# Patient Record
Sex: MALE | Race: WHITE | NOT HISPANIC OR LATINO | Employment: UNEMPLOYED | URBAN - METROPOLITAN AREA
[De-identification: names, ages, dates, MRNs, and addresses within clinical notes are randomized per-mention and may not be internally consistent; named-entity substitution may affect disease eponyms.]

---

## 2024-05-25 ENCOUNTER — HOSPITAL ENCOUNTER (INPATIENT)
Facility: HOSPITAL | Age: 49
LOS: 1 days | Discharge: LEFT BEFORE DISCHARGE | DRG: 918 | End: 2024-05-26
Attending: STUDENT IN AN ORGANIZED HEALTH CARE EDUCATION/TRAINING PROGRAM | Admitting: INTERNAL MEDICINE

## 2024-05-25 DIAGNOSIS — R79.89 ELEVATED TROPONIN I LEVEL: ICD-10-CM

## 2024-05-25 DIAGNOSIS — T50.901A OVERDOSE: ICD-10-CM

## 2024-05-25 DIAGNOSIS — M62.82 NON-TRAUMATIC RHABDOMYOLYSIS: ICD-10-CM

## 2024-05-25 DIAGNOSIS — R07.9 CHEST PAIN: ICD-10-CM

## 2024-05-25 DIAGNOSIS — E87.20 LACTIC ACIDOSIS: ICD-10-CM

## 2024-05-25 DIAGNOSIS — R79.89 ELEVATED TROPONIN: Primary | ICD-10-CM

## 2024-05-25 PROCEDURE — 99285 EMERGENCY DEPT VISIT HI MDM: CPT

## 2024-05-25 NOTE — Clinical Note
Diagnosis: Overdose [202577]   Future Attending Provider: HAMLET GUADARRAMA [[id number]]   Reason for IP Medical Treatment  (Clinical interventions that can only be accomplished in the IP setting? ) :: elevated troponin   I certify that Inpatient services for greater than or equal to 2 midnights are medically necessary:: Yes   Plans for Post-Acute care--if anticipated (pick the single best option):: A. No post acute care anticipated at this time

## 2024-05-26 VITALS
BODY MASS INDEX: 29.97 KG/M2 | SYSTOLIC BLOOD PRESSURE: 131 MMHG | HEART RATE: 77 BPM | WEIGHT: 214.06 LBS | OXYGEN SATURATION: 94 % | HEIGHT: 71 IN | DIASTOLIC BLOOD PRESSURE: 70 MMHG | RESPIRATION RATE: 20 BRPM | TEMPERATURE: 98 F

## 2024-05-26 PROBLEM — F10.10 ALCOHOL ABUSE: Status: ACTIVE | Noted: 2024-05-26

## 2024-05-26 PROBLEM — F19.10 POLYSUBSTANCE ABUSE: Status: ACTIVE | Noted: 2024-05-26

## 2024-05-26 PROBLEM — F41.8 DEPRESSION WITH ANXIETY: Status: ACTIVE | Noted: 2024-05-26

## 2024-05-26 PROBLEM — R11.2 NAUSEA AND VOMITING: Status: ACTIVE | Noted: 2024-05-26

## 2024-05-26 PROBLEM — T50.901A DRUG OVERDOSE, ACCIDENTAL OR UNINTENTIONAL, INITIAL ENCOUNTER: Status: ACTIVE | Noted: 2024-05-26

## 2024-05-26 PROBLEM — R21 RASH: Status: ACTIVE | Noted: 2024-05-26

## 2024-05-26 PROBLEM — R03.0 ELEVATED BLOOD PRESSURE READING WITHOUT DIAGNOSIS OF HYPERTENSION: Status: ACTIVE | Noted: 2024-05-26

## 2024-05-26 PROBLEM — R79.89 ELEVATED TROPONIN: Status: ACTIVE | Noted: 2024-05-26

## 2024-05-26 PROBLEM — R09.02 HYPOXIA: Status: ACTIVE | Noted: 2024-05-26

## 2024-05-26 PROBLEM — E87.20 LACTIC ACIDOSIS: Status: ACTIVE | Noted: 2024-05-26

## 2024-05-26 PROBLEM — R74.8 ELEVATED CK: Status: ACTIVE | Noted: 2024-05-26

## 2024-05-26 LAB
ALBUMIN SERPL BCP-MCNC: 4.4 G/DL (ref 3.5–5.2)
ALP SERPL-CCNC: 42 U/L (ref 55–135)
ALT SERPL W/O P-5'-P-CCNC: 30 U/L (ref 10–44)
AMPHET+METHAMPHET UR QL: NEGATIVE
ANION GAP SERPL CALC-SCNC: 17 MMOL/L (ref 8–16)
AORTIC ROOT ANNULUS: 3.72 CM
AST SERPL-CCNC: 41 U/L (ref 10–40)
AV INDEX (PROSTH): 0.76
AV MEAN GRADIENT: 5 MMHG
AV PEAK GRADIENT: 10 MMHG
AV VALVE AREA BY VELOCITY RATIO: 3.46 CM²
AV VALVE AREA: 3.21 CM²
AV VELOCITY RATIO: 0.82
BACTERIA #/AREA URNS HPF: ABNORMAL /HPF
BARBITURATES UR QL SCN>200 NG/ML: NEGATIVE
BASOPHILS # BLD AUTO: 0.02 K/UL (ref 0–0.2)
BASOPHILS NFR BLD: 0.2 % (ref 0–1.9)
BENZODIAZ UR QL SCN>200 NG/ML: ABNORMAL
BILIRUB SERPL-MCNC: 0.4 MG/DL (ref 0.1–1)
BILIRUB UR QL STRIP: NEGATIVE
BNP SERPL-MCNC: 47 PG/ML (ref 0–99)
BSA FOR ECHO PROCEDURE: 2.21 M2
BUN SERPL-MCNC: 6 MG/DL (ref 6–20)
BZE UR QL SCN: NEGATIVE
CALCIUM SERPL-MCNC: 8.9 MG/DL (ref 8.7–10.5)
CANNABINOIDS UR QL SCN: ABNORMAL
CHLORIDE SERPL-SCNC: 102 MMOL/L (ref 95–110)
CHOLEST SERPL-MCNC: 183 MG/DL (ref 120–199)
CHOLEST/HDLC SERPL: 3.5 {RATIO} (ref 2–5)
CK SERPL-CCNC: 866 U/L (ref 20–200)
CLARITY UR: CLEAR
CO2 SERPL-SCNC: 17 MMOL/L (ref 23–29)
COLOR UR: YELLOW
CREAT SERPL-MCNC: 1.2 MG/DL (ref 0.5–1.4)
CREAT UR-MCNC: 117.7 MG/DL (ref 23–375)
CV ECHO LV RWT: 0.37 CM
DIFFERENTIAL METHOD BLD: ABNORMAL
DOP CALC AO PEAK VEL: 1.6 M/S
DOP CALC AO VTI: 34.6 CM
DOP CALC LVOT AREA: 4.2 CM2
DOP CALC LVOT DIAMETER: 2.32 CM
DOP CALC LVOT PEAK VEL: 1.31 M/S
DOP CALC LVOT STROKE VOLUME: 111.12 CM3
DOP CALC RVOT PEAK VEL: 0.59 M/S
DOP CALC RVOT VTI: 14 CM
DOP CALCLVOT PEAK VEL VTI: 26.3 CM
E WAVE DECELERATION TIME: 317.11 MSEC
E/A RATIO: 1.42
E/E' RATIO: 5.87 M/S
ECHO LV POSTERIOR WALL: 0.96 CM (ref 0.6–1.1)
EJECTION FRACTION: 60 %
EOSINOPHIL # BLD AUTO: 0 K/UL (ref 0–0.5)
EOSINOPHIL NFR BLD: 0 % (ref 0–8)
ERYTHROCYTE [DISTWIDTH] IN BLOOD BY AUTOMATED COUNT: 11.9 % (ref 11.5–14.5)
EST. GFR  (NO RACE VARIABLE): >60 ML/MIN/1.73 M^2
ESTIMATED AVG GLUCOSE: 97 MG/DL (ref 68–131)
ETHANOL SERPL-MCNC: 21 MG/DL
FRACTIONAL SHORTENING: 43 % (ref 28–44)
GLUCOSE SERPL-MCNC: 105 MG/DL (ref 70–110)
GLUCOSE UR QL STRIP: NEGATIVE
GRAN CASTS #/AREA URNS LPF: 39 /LPF
HBA1C MFR BLD: 5 % (ref 4–5.6)
HCT VFR BLD AUTO: 40.7 % (ref 40–54)
HCT VFR BLD AUTO: 42.3 % (ref 40–54)
HDLC SERPL-MCNC: 53 MG/DL (ref 40–75)
HDLC SERPL: 29 % (ref 20–50)
HGB BLD-MCNC: 14.6 G/DL (ref 14–18)
HGB BLD-MCNC: 15 G/DL (ref 14–18)
HGB UR QL STRIP: NEGATIVE
HYALINE CASTS #/AREA URNS LPF: 15 /LPF
IMM GRANULOCYTES # BLD AUTO: 0.05 K/UL (ref 0–0.04)
IMM GRANULOCYTES NFR BLD AUTO: 0.5 % (ref 0–0.5)
INR PPP: 0.9 (ref 0.8–1.2)
INTERVENTRICULAR SEPTUM: 1.01 CM (ref 0.6–1.1)
IVRT: 82.78 MSEC
KETONES UR QL STRIP: ABNORMAL
LA MAJOR: 4.6 CM
LA MINOR: 5.62 CM
LA WIDTH: 4 CM
LACTATE SERPL-SCNC: 1 MMOL/L (ref 0.5–2.2)
LACTATE SERPL-SCNC: 2.3 MMOL/L (ref 0.5–2.2)
LACTATE SERPL-SCNC: 4.2 MMOL/L (ref 0.5–2.2)
LDLC SERPL CALC-MCNC: 111.8 MG/DL (ref 63–159)
LEFT ATRIUM SIZE: 3.94 CM
LEFT ATRIUM VOLUME INDEX MOD: 34.6 ML/M2
LEFT ATRIUM VOLUME INDEX: 31.2 ML/M2
LEFT ATRIUM VOLUME MOD: 75.13 CM3
LEFT ATRIUM VOLUME: 67.77 CM3
LEFT INTERNAL DIMENSION IN SYSTOLE: 2.93 CM (ref 2.1–4)
LEFT VENTRICLE DIASTOLIC VOLUME INDEX: 58.77 ML/M2
LEFT VENTRICLE DIASTOLIC VOLUME: 127.54 ML
LEFT VENTRICLE MASS INDEX: 87 G/M2
LEFT VENTRICLE SYSTOLIC VOLUME INDEX: 15.2 ML/M2
LEFT VENTRICLE SYSTOLIC VOLUME: 33.07 ML
LEFT VENTRICULAR INTERNAL DIMENSION IN DIASTOLE: 5.17 CM (ref 3.5–6)
LEFT VENTRICULAR MASS: 188.47 G
LEUKOCYTE ESTERASE UR QL STRIP: NEGATIVE
LV LATERAL E/E' RATIO: 5.87 M/S
LV SEPTAL E/E' RATIO: 5.87 M/S
LVOT MG: 3.41 MMHG
LVOT MV: 0.85 CM/S
LYMPHOCYTES # BLD AUTO: 0.7 K/UL (ref 1–4.8)
LYMPHOCYTES NFR BLD: 7 % (ref 18–48)
MCH RBC QN AUTO: 33.4 PG (ref 27–31)
MCHC RBC AUTO-ENTMCNC: 35.5 G/DL (ref 32–36)
MCV RBC AUTO: 94 FL (ref 82–98)
METHADONE UR QL SCN>300 NG/ML: NEGATIVE
MICROSCOPIC COMMENT: ABNORMAL
MONOCYTES # BLD AUTO: 0.5 K/UL (ref 0.3–1)
MONOCYTES NFR BLD: 5.1 % (ref 4–15)
MV PEAK A VEL: 0.62 M/S
MV PEAK E VEL: 0.88 M/S
NEUTROPHILS # BLD AUTO: 8.8 K/UL (ref 1.8–7.7)
NEUTROPHILS NFR BLD: 87.2 % (ref 38–73)
NITRITE UR QL STRIP: NEGATIVE
NONHDLC SERPL-MCNC: 130 MG/DL
NRBC BLD-RTO: 0 /100 WBC
OHS CV RV/LV RATIO: 0.51 CM
OPIATES UR QL SCN: NEGATIVE
PCP UR QL SCN>25 NG/ML: NEGATIVE
PH UR STRIP: 5 [PH] (ref 5–8)
PISA TR MAX VEL: 2.32 M/S
PLATELET # BLD AUTO: 261 K/UL (ref 150–450)
PMV BLD AUTO: 8.8 FL (ref 9.2–12.9)
POTASSIUM SERPL-SCNC: 4.2 MMOL/L (ref 3.5–5.1)
PROT SERPL-MCNC: 7.4 G/DL (ref 6–8.4)
PROT UR QL STRIP: ABNORMAL
PROTHROMBIN TIME: 11 SEC (ref 9–12.5)
PV MEAN GRADIENT: 1 MMHG
PV PEAK GRADIENT: 3 MMHG
PV PEAK VELOCITY: 0.86 M/S
RA MAJOR: 5.1 CM
RA PRESSURE ESTIMATED: 3 MMHG
RA WIDTH: 3.8 CM
RBC # BLD AUTO: 4.49 M/UL (ref 4.6–6.2)
RBC #/AREA URNS HPF: 0 /HPF (ref 0–4)
RIGHT VENTRICULAR END-DIASTOLIC DIMENSION: 2.63 CM
RV TB RVSP: 5 MMHG
SODIUM SERPL-SCNC: 136 MMOL/L (ref 136–145)
SP GR UR STRIP: 1.01 (ref 1–1.03)
STJ: 3.06 CM
TDI LATERAL: 0.15 M/S
TDI SEPTAL: 0.15 M/S
TDI: 0.15 M/S
TOXICOLOGY INFORMATION: ABNORMAL
TR MAX PG: 22 MMHG
TRICUSPID ANNULAR PLANE SYSTOLIC EXCURSION: 2.27 CM
TRIGL SERPL-MCNC: 91 MG/DL (ref 30–150)
TROPONIN I SERPL DL<=0.01 NG/ML-MCNC: 0.04 NG/ML (ref 0–0.03)
TROPONIN I SERPL DL<=0.01 NG/ML-MCNC: 0.07 NG/ML (ref 0–0.03)
TROPONIN I SERPL DL<=0.01 NG/ML-MCNC: 0.08 NG/ML (ref 0–0.03)
TSH SERPL DL<=0.005 MIU/L-ACNC: 1.38 UIU/ML (ref 0.4–4)
TV REST PULMONARY ARTERY PRESSURE: 25 MMHG
URN SPEC COLLECT METH UR: ABNORMAL
UROBILINOGEN UR STRIP-ACNC: NEGATIVE EU/DL
WBC # BLD AUTO: 10.1 K/UL (ref 3.9–12.7)
WBC #/AREA URNS HPF: 3 /HPF (ref 0–5)
Z-SCORE OF LEFT VENTRICULAR DIMENSION IN END DIASTOLE: -3.29
Z-SCORE OF LEFT VENTRICULAR DIMENSION IN END SYSTOLE: -3.16

## 2024-05-26 PROCEDURE — 83605 ASSAY OF LACTIC ACID: CPT | Mod: 91 | Performed by: INTERNAL MEDICINE

## 2024-05-26 PROCEDURE — 93010 ELECTROCARDIOGRAM REPORT: CPT | Mod: ,,, | Performed by: INTERNAL MEDICINE

## 2024-05-26 PROCEDURE — 85610 PROTHROMBIN TIME: CPT | Performed by: INTERNAL MEDICINE

## 2024-05-26 PROCEDURE — 83605 ASSAY OF LACTIC ACID: CPT | Mod: 91 | Performed by: STUDENT IN AN ORGANIZED HEALTH CARE EDUCATION/TRAINING PROGRAM

## 2024-05-26 PROCEDURE — 83036 HEMOGLOBIN GLYCOSYLATED A1C: CPT | Performed by: INTERNAL MEDICINE

## 2024-05-26 PROCEDURE — 82550 ASSAY OF CK (CPK): CPT | Performed by: STUDENT IN AN ORGANIZED HEALTH CARE EDUCATION/TRAINING PROGRAM

## 2024-05-26 PROCEDURE — 63600175 PHARM REV CODE 636 W HCPCS: Performed by: INTERNAL MEDICINE

## 2024-05-26 PROCEDURE — 25000003 PHARM REV CODE 250: Performed by: INTERNAL MEDICINE

## 2024-05-26 PROCEDURE — G0425 INPT/ED TELECONSULT30: HCPCS | Mod: 95,,, | Performed by: PSYCHIATRY & NEUROLOGY

## 2024-05-26 PROCEDURE — 93005 ELECTROCARDIOGRAM TRACING: CPT

## 2024-05-26 PROCEDURE — 80061 LIPID PANEL: CPT | Performed by: INTERNAL MEDICINE

## 2024-05-26 PROCEDURE — 63600175 PHARM REV CODE 636 W HCPCS: Performed by: STUDENT IN AN ORGANIZED HEALTH CARE EDUCATION/TRAINING PROGRAM

## 2024-05-26 PROCEDURE — 94761 N-INVAS EAR/PLS OXIMETRY MLT: CPT

## 2024-05-26 PROCEDURE — 80307 DRUG TEST PRSMV CHEM ANLYZR: CPT | Performed by: STUDENT IN AN ORGANIZED HEALTH CARE EDUCATION/TRAINING PROGRAM

## 2024-05-26 PROCEDURE — 96375 TX/PRO/DX INJ NEW DRUG ADDON: CPT

## 2024-05-26 PROCEDURE — 85014 HEMATOCRIT: CPT | Performed by: INTERNAL MEDICINE

## 2024-05-26 PROCEDURE — 84484 ASSAY OF TROPONIN QUANT: CPT | Mod: 91 | Performed by: STUDENT IN AN ORGANIZED HEALTH CARE EDUCATION/TRAINING PROGRAM

## 2024-05-26 PROCEDURE — 36415 COLL VENOUS BLD VENIPUNCTURE: CPT | Performed by: INTERNAL MEDICINE

## 2024-05-26 PROCEDURE — 81000 URINALYSIS NONAUTO W/SCOPE: CPT | Mod: 59 | Performed by: STUDENT IN AN ORGANIZED HEALTH CARE EDUCATION/TRAINING PROGRAM

## 2024-05-26 PROCEDURE — 21400001 HC TELEMETRY ROOM

## 2024-05-26 PROCEDURE — 84443 ASSAY THYROID STIM HORMONE: CPT | Performed by: INTERNAL MEDICINE

## 2024-05-26 PROCEDURE — 85018 HEMOGLOBIN: CPT | Performed by: INTERNAL MEDICINE

## 2024-05-26 PROCEDURE — 84484 ASSAY OF TROPONIN QUANT: CPT | Mod: 91 | Performed by: INTERNAL MEDICINE

## 2024-05-26 PROCEDURE — 25000003 PHARM REV CODE 250: Performed by: STUDENT IN AN ORGANIZED HEALTH CARE EDUCATION/TRAINING PROGRAM

## 2024-05-26 PROCEDURE — 82077 ASSAY SPEC XCP UR&BREATH IA: CPT | Performed by: INTERNAL MEDICINE

## 2024-05-26 PROCEDURE — C9113 INJ PANTOPRAZOLE SODIUM, VIA: HCPCS | Performed by: INTERNAL MEDICINE

## 2024-05-26 PROCEDURE — 83880 ASSAY OF NATRIURETIC PEPTIDE: CPT | Performed by: INTERNAL MEDICINE

## 2024-05-26 PROCEDURE — 94799 UNLISTED PULMONARY SVC/PX: CPT

## 2024-05-26 PROCEDURE — 63600175 PHARM REV CODE 636 W HCPCS: Performed by: EMERGENCY MEDICINE

## 2024-05-26 PROCEDURE — 99222 1ST HOSP IP/OBS MODERATE 55: CPT | Mod: ,,, | Performed by: INTERNAL MEDICINE

## 2024-05-26 PROCEDURE — 85025 COMPLETE CBC W/AUTO DIFF WBC: CPT | Performed by: STUDENT IN AN ORGANIZED HEALTH CARE EDUCATION/TRAINING PROGRAM

## 2024-05-26 PROCEDURE — 96361 HYDRATE IV INFUSION ADD-ON: CPT

## 2024-05-26 PROCEDURE — 96374 THER/PROPH/DIAG INJ IV PUSH: CPT

## 2024-05-26 PROCEDURE — 80053 COMPREHEN METABOLIC PANEL: CPT | Performed by: STUDENT IN AN ORGANIZED HEALTH CARE EDUCATION/TRAINING PROGRAM

## 2024-05-26 PROCEDURE — C9113 INJ PANTOPRAZOLE SODIUM, VIA: HCPCS | Performed by: STUDENT IN AN ORGANIZED HEALTH CARE EDUCATION/TRAINING PROGRAM

## 2024-05-26 RX ORDER — NALOXONE HCL 0.4 MG/ML
0.02 VIAL (ML) INJECTION
Status: DISCONTINUED | OUTPATIENT
Start: 2024-05-26 | End: 2024-05-26 | Stop reason: HOSPADM

## 2024-05-26 RX ORDER — THIAMINE HCL 100 MG
100 TABLET ORAL DAILY
Status: DISCONTINUED | OUTPATIENT
Start: 2024-05-26 | End: 2024-05-26 | Stop reason: HOSPADM

## 2024-05-26 RX ORDER — ACETAMINOPHEN 325 MG/1
650 TABLET ORAL EVERY 6 HOURS PRN
Status: DISCONTINUED | OUTPATIENT
Start: 2024-05-26 | End: 2024-05-26 | Stop reason: HOSPADM

## 2024-05-26 RX ORDER — AMLODIPINE BESYLATE 5 MG/1
5 TABLET ORAL DAILY
Status: DISCONTINUED | OUTPATIENT
Start: 2024-05-26 | End: 2024-05-26 | Stop reason: HOSPADM

## 2024-05-26 RX ORDER — SODIUM CHLORIDE 9 MG/ML
INJECTION, SOLUTION INTRAVENOUS CONTINUOUS
Status: DISCONTINUED | OUTPATIENT
Start: 2024-05-26 | End: 2024-05-26 | Stop reason: HOSPADM

## 2024-05-26 RX ORDER — SODIUM CHLORIDE 9 MG/ML
1000 INJECTION, SOLUTION INTRAVENOUS
Status: COMPLETED | OUTPATIENT
Start: 2024-05-26 | End: 2024-05-26

## 2024-05-26 RX ORDER — PANTOPRAZOLE SODIUM 40 MG/10ML
40 INJECTION, POWDER, LYOPHILIZED, FOR SOLUTION INTRAVENOUS 2 TIMES DAILY
Status: DISCONTINUED | OUTPATIENT
Start: 2024-05-26 | End: 2024-05-26 | Stop reason: HOSPADM

## 2024-05-26 RX ORDER — FOLIC ACID 1 MG/1
1 TABLET ORAL DAILY
Status: DISCONTINUED | OUTPATIENT
Start: 2024-05-26 | End: 2024-05-26 | Stop reason: HOSPADM

## 2024-05-26 RX ORDER — IPRATROPIUM BROMIDE AND ALBUTEROL SULFATE 2.5; .5 MG/3ML; MG/3ML
3 SOLUTION RESPIRATORY (INHALATION) EVERY 6 HOURS PRN
Status: DISCONTINUED | OUTPATIENT
Start: 2024-05-26 | End: 2024-05-26 | Stop reason: HOSPADM

## 2024-05-26 RX ORDER — IBUPROFEN 200 MG
24 TABLET ORAL
Status: DISCONTINUED | OUTPATIENT
Start: 2024-05-26 | End: 2024-05-26 | Stop reason: HOSPADM

## 2024-05-26 RX ORDER — IBUPROFEN 200 MG
16 TABLET ORAL
Status: DISCONTINUED | OUTPATIENT
Start: 2024-05-26 | End: 2024-05-26 | Stop reason: HOSPADM

## 2024-05-26 RX ORDER — ONDANSETRON HYDROCHLORIDE 2 MG/ML
4 INJECTION, SOLUTION INTRAVENOUS EVERY 6 HOURS PRN
Status: DISCONTINUED | OUTPATIENT
Start: 2024-05-26 | End: 2024-05-26 | Stop reason: HOSPADM

## 2024-05-26 RX ORDER — CHLORDIAZEPOXIDE HYDROCHLORIDE 25 MG/1
25 CAPSULE, GELATIN COATED ORAL 4 TIMES DAILY PRN
Status: DISCONTINUED | OUTPATIENT
Start: 2024-05-26 | End: 2024-05-26 | Stop reason: HOSPADM

## 2024-05-26 RX ORDER — PANTOPRAZOLE SODIUM 40 MG/10ML
80 INJECTION, POWDER, LYOPHILIZED, FOR SOLUTION INTRAVENOUS
Status: COMPLETED | OUTPATIENT
Start: 2024-05-26 | End: 2024-05-26

## 2024-05-26 RX ORDER — HYDRALAZINE HYDROCHLORIDE 20 MG/ML
10 INJECTION INTRAMUSCULAR; INTRAVENOUS EVERY 6 HOURS PRN
Status: DISCONTINUED | OUTPATIENT
Start: 2024-05-26 | End: 2024-05-26 | Stop reason: HOSPADM

## 2024-05-26 RX ORDER — ONDANSETRON HYDROCHLORIDE 2 MG/ML
4 INJECTION, SOLUTION INTRAVENOUS
Status: COMPLETED | OUTPATIENT
Start: 2024-05-26 | End: 2024-05-26

## 2024-05-26 RX ORDER — ESCITALOPRAM OXALATE 10 MG/1
20 TABLET ORAL DAILY
Status: DISCONTINUED | OUTPATIENT
Start: 2024-05-26 | End: 2024-05-26 | Stop reason: HOSPADM

## 2024-05-26 RX ORDER — SODIUM CHLORIDE 0.9 % (FLUSH) 0.9 %
10 SYRINGE (ML) INJECTION EVERY 12 HOURS PRN
Status: DISCONTINUED | OUTPATIENT
Start: 2024-05-26 | End: 2024-05-26 | Stop reason: HOSPADM

## 2024-05-26 RX ORDER — GLUCAGON 1 MG
1 KIT INJECTION
Status: DISCONTINUED | OUTPATIENT
Start: 2024-05-26 | End: 2024-05-26 | Stop reason: HOSPADM

## 2024-05-26 RX ADMIN — SODIUM CHLORIDE 1000 ML: 9 INJECTION, SOLUTION INTRAVENOUS at 12:05

## 2024-05-26 RX ADMIN — Medication 100 MG: at 09:05

## 2024-05-26 RX ADMIN — FOLIC ACID 1 MG: 1 TABLET ORAL at 09:05

## 2024-05-26 RX ADMIN — PANTOPRAZOLE SODIUM 80 MG: 40 INJECTION, POWDER, LYOPHILIZED, FOR SOLUTION INTRAVENOUS at 01:05

## 2024-05-26 RX ADMIN — AMLODIPINE BESYLATE 5 MG: 5 TABLET ORAL at 09:05

## 2024-05-26 RX ADMIN — SODIUM CHLORIDE: 9 INJECTION, SOLUTION INTRAVENOUS at 06:05

## 2024-05-26 RX ADMIN — SODIUM CHLORIDE 1000 ML: 9 INJECTION, SOLUTION INTRAVENOUS at 01:05

## 2024-05-26 RX ADMIN — ONDANSETRON 4 MG: 2 INJECTION INTRAMUSCULAR; INTRAVENOUS at 04:05

## 2024-05-26 RX ADMIN — ESCITALOPRAM OXALATE 20 MG: 10 TABLET ORAL at 09:05

## 2024-05-26 RX ADMIN — ONDANSETRON 4 MG: 2 INJECTION INTRAMUSCULAR; INTRAVENOUS at 01:05

## 2024-05-26 RX ADMIN — ACETAMINOPHEN 650 MG: 325 TABLET ORAL at 06:05

## 2024-05-26 RX ADMIN — PANTOPRAZOLE SODIUM 40 MG: 40 INJECTION, POWDER, LYOPHILIZED, FOR SOLUTION INTRAVENOUS at 09:05

## 2024-05-26 NOTE — H&P
[facility] - Emergency Dept.  [facility] Medicine  History & Physical    Patient Name: Timo Love  MRN: [id number]  Patient Class: IP- Inpatient  Admission Date: 5/25/2024  Attending Physician:  Janet Mohr MD  Primary Care Provider: No primary care provider on file.         Patient information was obtained from patient, ER records, and ER physician .     Subjective:     Principal Problem:Drug overdose, accidental or unintentional, initial encounter    Chief Complaint:   Chief Complaint   Patient presents with    Drug Overdose     Cyanotic and apneic on EMS arrival with SpO2 at 60%. + response to 1mg narcan IM.        HPI:   49-year-old white man with history of polysubstance abuse, alcohol abuse, depression with anxiety, and previous alcohol withdrawal seizures who presented to the emergency department via EMS after being found cyanotic, apneic, and with O2 saturation in the 60s.  Patient was administered 1 mg of Narcan IM with good response.  Patient reports that he had been clean and sober from heroin for over a year and had been clean and sober from alcohol for 42 days.  Patient relapsed last night on both alcohol and heroin.  Patient had been in his usual state of health prior to presentation.  He does report that he noticed that his blood pressure has been running higher than usual since he has not been drinking alcohol.  He currently denies any chest pain, cough, fevers, chills.  He does have some mild shortness of breath, constant, no aggravating or alleviating factors, no associated wheezing. He did have several episodes of nausea with vomiting in the emergency department with his 1st episode appearing like coffee-ground emesis.  ER physician did give Protonix 80 mg IV x1 dose.  Patient denies recent bleeding from any source.  He denies hemoptysis, hematemesis, bright red blood per rectum, melena, or hematuria.  He denies any associated abdominal pain.  Patient does have a rash (related to chiggers) to  his bilateral lower extremities which he reports was from hiking last week, areas are no longer pruritic and are healing per patient.    No past medical history on file.    No past surgical history on file.    Review of patient's allergies indicates:  No Known Allergies    No current facility-administered medications on file prior to encounter.     No current outpatient medications on file prior to encounter.     Family History    None       Tobacco Use    Smoking status: Not on file    Smokeless tobacco: Not on file   Substance and Sexual Activity    Alcohol use: Not on file    Drug use: Not on file    Sexual activity: Not on file     Review of Systems   Constitutional:  Negative for chills and fever.   Respiratory:  Positive for shortness of breath. Negative for cough.    Cardiovascular:  Negative for chest pain.   Gastrointestinal:  Positive for nausea and vomiting. Negative for abdominal pain, blood in stool and diarrhea.   Skin:  Positive for rash.   All other systems reviewed and are negative.    Objective:     Vital Signs (Most Recent):  Temp: 99.1 °F (37.3 °C) (05/25/24 2322)  Pulse: 99 (05/26/24 0402)  Resp: 15 (05/26/24 0200)  BP: (!) 148/91 (05/26/24 0402)  SpO2: (!) 94 % (05/26/24 0402) Vital Signs (24h Range):  Temp:  [99.1 °F (37.3 °C)] 99.1 °F (37.3 °C)  Pulse:  [] 99  Resp:  [15-26] 15  SpO2:  [92 %-96 %] 94 %  BP: (127-160)/() 148/91        There is no height or weight on file to calculate BMI.     Physical Exam  Vitals reviewed.   Constitutional:       General: He is not in acute distress.     Appearance: He is not ill-appearing or diaphoretic.   HENT:      Nose: Nose normal.   Eyes:      Conjunctiva/sclera: Conjunctivae normal.   Cardiovascular:      Rate and Rhythm: Normal rate.   Pulmonary:      Effort: Pulmonary effort is normal. No respiratory distress.      Breath sounds: No wheezing.   Abdominal:      General: There is no distension.      Tenderness: There is no abdominal  tenderness.   Musculoskeletal:         General: No swelling.   Skin:     General: Skin is warm and dry.      Findings: Rash (to BLE related to recent chiggers per patient) present.   Neurological:      Mental Status: He is alert and oriented to person, place, and time.   Psychiatric:         Mood and Affect: Mood normal.         Behavior: Behavior normal.                Significant Labs: All pertinent labs within the past 24 hours have been reviewed.  Recent Lab Results  (Last 5 results in the past 24 hours)        05/26/24  0425   05/26/24  0313   05/26/24  0040   05/26/24  0029   05/26/24  0013        Benzodiazepines       Presumptive Positive         Methadone metabolites       Negative         Phencyclidine       Negative         Albumin         4.4       Alcohol, Serum 21               ALP         42       ALT         30       Amphetamines, Urine       Negative         Anion Gap         17       Appearance, UA     Clear           AST         41       Bacteria, UA     None           Barbituates, Urine       Negative         Baso #         0.02       Basophil %         0.2       Bilirubin (UA)     Negative           BILIRUBIN TOTAL         0.4  Comment: For infants and newborns, interpretation of results should be based  on gestational age, weight and in agreement with clinical  observations.    Premature Infant recommended reference ranges:  Up to 24 hours.............<8.0 mg/dL  Up to 48 hours............<12.0 mg/dL  3-5 days..................<15.0 mg/dL  6-29 days.................<15.0 mg/dL         BUN         6       Calcium         8.9       Chloride         102       CO2         17       Cocaine, Urine       Negative         Color, UA     Yellow           CPK   866             Creatinine         1.2       Urine Creatinine       117.7         Differential Method         Automated       eGFR         >60       Eos #         0.0       Eos %         0.0       Glucose         105       Glucose, UA     Negative            Gran # (ANC)         8.8       Gran %         87.2       Granular Casts, UA     39           Hematocrit         42.3       Hemoglobin         15.0       Hyaline Casts, UA     15           Immature Grans (Abs)         0.05  Comment: Mild elevation in immature granulocytes is non specific and   can be seen in a variety of conditions including stress response,   acute inflammation, trauma and pregnancy. Correlation with other   laboratory and clinical findings is essential.         Immature Granulocytes         0.5       Ketones, UA     Trace           Lactic Acid Level   2.3  Comment: Falsely low lactic acid results can be found in samples   containing >=13.0 mg/dL total bilirubin and/or >=3.5 mg/dL   direct bilirubin.         4.2  Comment: Falsely low lactic acid results can be found in samples   containing >=13.0 mg/dL total bilirubin and/or >=3.5 mg/dL   direct bilirubin.  LA  critical result(s) called and verbal readback obtained from JUAN M KOLB RN ED by [facility] 05/26/2024 00:42         Leukocyte Esterase, UA     Negative           Lymph #         0.7       Lymph %         7.0       MCH         33.4       MCHC         35.5       MCV         94       Microscopic Comment     SEE COMMENT  Comment: Other formed elements not mentioned in the report are not   present in the microscopic examination.              Mono #         0.5       Mono %         5.1       MPV         8.8       NITRITE UA     Negative           nRBC         0       Blood, UA     Negative           Opiates, Urine       Negative         pH, UA     5.0           Platelet Count         261       Potassium         4.2       PROTEIN TOTAL         7.4       Protein, UA     1+  Comment: Recommend a 24 hour urine protein or a urine   protein/creatinine ratio if globulin induced proteinuria is  clinically suspected.             RBC         4.49       RBC, UA     0           RDW         11.9       Sodium         136       Specific [place], UA     1.015            Specimen UA     Urine, Clean Catch           Marijuana (THC) Metabolite       Presumptive Positive         Toxicology Information       SEE COMMENT  Comment: This screen includes the following classes of drugs at the listed   cut-off:    Benzodiazepines 200 ng/ml  Methadone 300 ng/ml  Cocaine metabolite 300 ng/ml  Opiates 300 ng/ml  Barbiturates 200 ng/ml  Amphetamines 1000 ng/ml  Marijuana metabs (THC) 50 ng/ml  Phencyclidine (PCP) 25 ng/ml    This is a screening test. If results do not correlate with clinical   presentation, then a confirmatory send out test is advised.     This report is intended for use in clinical monitoring and management   of   patients. It is not intended for use in employment related drug   testing.           Troponin I   0.080  Comment: The reference interval for Troponin I represents the 99th percentile   cutoff   for our facility and is consistent with 3rd generation assay   performance.         0.037  Comment: The reference interval for Troponin I represents the 99th percentile   cutoff   for our facility and is consistent with 3rd generation assay   performance.         UROBILINOGEN UA     Negative           WBC, UA     3           WBC         10.10                              Significant Imaging: I have reviewed all pertinent imaging results/findings within the past 24 hours.  X-Ray Chest AP Portable   Final Result      No acute findings         Electronically signed by: Remy Yun   Date:    05/26/2024   Time:    01:37         Assessment/Plan:     * Drug overdose, accidental or unintentional, initial encounter   Patient reports that he relapsed last night on both alcohol and heroin  - urine drug screen positive for benzodiazepines and THC  - patient had favorable response after IM Narcan administration  - counseled on cessation of alcohol and illicit drugs  - consider tele psych consult      Hypoxia   Hypoxia on EMS arrival, resolved  - related to drug overdose and  may be further complicated by nausea with vomiting and possible aspiration component  - chest x-ray negative  - continuous pulse oximetry monitoring, O2 supplementation if needed, incentive spirometry, p.r.n. DuoNebs, aspiration precautions  - antibiotics not indicated at this time      Lactic acidosis   Lactic acidosis improved with IV fluid resuscitation given in the emergency department  - lactic acid level on arrival 4.2 and repeat 2.3, white blood cell count 10.10, urinalysis negative for infection, chest x-ray negative, T-max 99.1°  - patient received 2 L normal saline IV fluid bolus in the emergency department  - continue IV fluids with normal saline at 100 mL/hr  - check serial lactic acid level  - antibiotics not indicated at this time      Elevated troponin   Elevated troponin most likely related to drug overdose and hypoxia with ventricular strain, abnormal EKG  - troponin 0.037 and repeat 0.080  - EKG #1 with accelerated junctional rhythm and nonspecific ST segment changes with some elevation  - EKG #2 with normal sinus rhythm at 81 with nonspecific ST segment changes  - chest x-ray negative  - check serial cardiac enzymes, BNP, TSH, fasting lipid panel, and A1c  - check echocardiogram  - NPO except for ice chips and with IV fluids  - telemetry monitoring  - cardiology consult      Elevated blood pressure reading without diagnosis of hypertension  - p.r.n. IV hydralazine with parameters      Polysubstance abuse   Patient admits to heroin use  - urine drug screen was only positive for benzodiazepines and THC  - counseled on illicit drug cessation      Alcohol abuse  - counseled alcohol cessation  - [facility] protocol  - thiamine and folic acid supplementation      Nausea and vomiting   Several episodes of nausea and vomiting while in the emergency department.  Report of possible coffee-ground emesis with initial episode.  -  Hemoglobin 15  - status post Protonix 80 mg IV x1 dose in the emergency department  -  continue Protonix 40 mg IV b.I.d. dosing  - NPO except for ice chips, IV fluids, and p.r.n. antiemetics  - check H&H q.6 hours x2, INR, and occult blood  - check CBC in a.m.  - consider GI consult      Elevated CK    Elevated CK level with early rhabdomyolysis secondary to drug overdose  - CK level 866, creatinine 1.2, LFTs overall unremarkable  - patient received 2 L normal saline IV fluid bolus in the emergency department  - continue IV fluids with normal saline at 100 mL/hr  - check a.m. labs to include repeat CK level      Rash    Rash to bilateral lower extremities which patient attributes to hiking in the woods last week with chigger infestation.  Areas are now nonpruritic and healing per patient.      Depression with anxiety   Patient is currently on no home medications for this.  Consider tele psych consult.      VTE Risk Mitigation (From admission, onward)           Ordered     IP VTE LOW RISK PATIENT  Once         05/26/24 0453     Place sequential compression device  Until discontinued         05/26/24 0453                                    Janet Mohr MD  Department of Hospital Medicine  [facility] - Emergency Dept.

## 2024-05-26 NOTE — PLAN OF CARE
POC reviewed with pt. Pt verbalizes understanding of POC. No questions at this time.  AAOx3. NADN.  NSR on cardiac monitor.  Pt. Anxious and agitated about getting to his personal belongings things left at hotel he was staying at. Pt educated and agrees to stay for now.    Pt remains free of falls.  Safety measures in place. Will continue to monitor.  Informed pt to call for assistance before getting up. Pt verbalizes understanding.  Hourly rounding and chart check complete.

## 2024-05-26 NOTE — ASSESSMENT & PLAN NOTE
Lactic acidosis improved with IV fluid resuscitation given in the emergency department  - lactic acid level on arrival 4.2 and repeat 2.3, white blood cell count 10.10, urinalysis negative for infection, chest x-ray negative, T-max 99.1°  - patient received 2 L normal saline IV fluid bolus in the emergency department  - continue IV fluids with normal saline at 100 mL/hr  - check serial lactic acid level  - antibiotics not indicated at this time

## 2024-05-26 NOTE — ASSESSMENT & PLAN NOTE
Patient admits to heroin use  - urine drug screen was only positive for benzodiazepines and THC  - counseled on illicit drug cessation

## 2024-05-26 NOTE — HOSPITAL COURSE
Cardiology and tele psychiatry consulted. Pt continued on IVF given mildly elevated CPK.     Notified by RN approx 6pm that patient had gathered his belongings, taken out his IV and wanted to leave hospital. Advised patient that he would benefit from remaining in hospital for continued fluids, monitoring of labs and risks of leaving include worsening illness and death. He proceeded to walk out of the room as I was speaking with him. AMA paperwork signed at nurses station with multiple witnesses. Pt left hospital AGAINST MEDICAL ADVICE.

## 2024-05-26 NOTE — CONSULTS
[facility] - Telemetry ([facility])  Cardiology  Consult Note    Patient Name: Timo Love  MRN: [id number]  Admission Date: 5/25/2024  Hospital Length of Stay: 0 days  Code Status: Full Code   Attending Provider: Amanda Lipscomb MD   Consulting Provider: Kamar Baumann MD  Primary Care Physician: No primary care provider on file.  Principal Problem:Drug overdose, accidental or unintentional, initial encounter    Patient information was obtained from patient and ER records.     Inpatient consult to Cardiology  Consult performed by: Doron Baumann MD  Consult ordered by: Janet Mohr MD        Subjective:         HPI:   Mr. Love is a 49 year old male with a past medical history of polysubstance abuse, and alcohol abuse. He was admitted for accelerated HTN, blood pressure 160/100. Patient notes that in the past 1-2 week(s) sober he noticed his diastolic was in the 100's or triple digits. Patient with SOB and headaches. Patient did have nausea and vomiting. A mild increase in troponin with a peak at 0.08 and trending down.   Recommend ECHO and blood pressure control and will start Norvasc. Troponin is from demand ischemia. If ECHO is normal patient can go home with blood pressure control medications.       Family History    None       Tobacco Use    Smoking status: Not on file    Smokeless tobacco: Not on file   Substance and Sexual Activity    Alcohol use: Not on file    Drug use: Not on file    Sexual activity: Not on file       Review of Systems   Constitutional:  Negative for chills and fever.   Respiratory:  Positive for shortness of breath. Negative for cough.    Cardiovascular:  Negative for chest pain.   Gastrointestinal:  Positive for nausea and vomiting. Negative for abdominal pain, blood in stool and diarrhea.   Skin:  Positive for rash.   All other systems reviewed and are negative.     Objective:      Vital Signs (Most Recent):  Temp: 99.1 °F (37.3 °C) (05/25/24 2322)  Pulse: 99 (05/26/24  0402)  Resp: 15 (05/26/24 0200)  BP: (!) 148/91 (05/26/24 0402)  SpO2: (!) 94 % (05/26/24 0402) Vital Signs (24h Range):  Temp:  [99.1 °F (37.3 °C)] 99.1 °F (37.3 °C)  Pulse:  [] 99  Resp:  [15-26] 15  SpO2:  [92 %-96 %] 94 %  BP: (127-160)/() 148/91         There is no height or weight on file to calculate BMI.     Physical Exam  Vitals reviewed.   Constitutional:       General: He is not in acute distress.     Appearance: He is not ill-appearing or diaphoretic.   HENT:      Nose: Nose normal.   Eyes:      Conjunctiva/sclera: Conjunctivae normal.   Cardiovascular:      Rate and Rhythm: Normal rate.   Pulmonary:      Effort: Pulmonary effort is normal. No respiratory distress.      Breath sounds: No wheezing.   Abdominal:      General: There is no distension.      Tenderness: There is no abdominal tenderness.   Musculoskeletal:         General: No swelling.   Skin:     General: Skin is warm and dry.      Findings: Rash present.   Neurological:      Mental Status: He is alert and oriented to person, place, and time.   Psychiatric:         Mood and Affect: Mood normal.         Behavior: Behavior normal.      Significant Labs: BMP:   Recent Labs   Lab 05/26/24  0013         K 4.2      CO2 17*   BUN 6   CREATININE 1.2   CALCIUM 8.9   , CMP   Recent Labs   Lab 05/26/24  0013      K 4.2      CO2 17*      BUN 6   CREATININE 1.2   CALCIUM 8.9   PROT 7.4   ALBUMIN 4.4   BILITOT 0.4   ALKPHOS 42*   AST 41*   ALT 30   ANIONGAP 17*   , CBC   Recent Labs   Lab 05/26/24  0013 05/26/24  0509   WBC 10.10  --    HGB 15.0 14.6   HCT 42.3 40.7     --    , and Troponin   Recent Labs   Lab 05/26/24  0013 05/26/24  0313 05/26/24  0509   TROPONINI 0.037* 0.080* 0.071*       Significant Imaging: Echocardiogram: 2D echo with color flow doppler: No results found for this or any previous visit. and Transthoracic echo (TTE) complete (Cupid Only):   Results for orders placed or performed  during the hospital encounter of 05/25/24   Echo   Result Value Ref Range    BSA 2.21 m2    LVOT stroke volume 111.12 cm3    LVIDd 5.17 3.5 - 6.0 cm    LV Systolic Volume 33.07 mL    LV Systolic Volume Index 15.2 mL/m2    LVIDs 2.93 2.1 - 4.0 cm    LV Diastolic Volume 127.54 mL    LV Diastolic Volume Index 58.77 mL/m2    IVS 1.01 0.6 - 1.1 cm    LVOT diameter 2.32 cm    LVOT area 4.2 cm2    FS 43 28 - 44 %    Left Ventricle Relative Wall Thickness 0.37 cm    Posterior Wall 0.96 0.6 - 1.1 cm    LV mass 188.47 g    LV Mass Index 87 g/m2    MV Peak E Martin 0.88 m/s    TDI LATERAL 0.15 m/s    TDI SEPTAL 0.15 m/s    E/E' ratio 5.87 m/s    MV Peak A Martin 0.62 m/s    TR Max Martin 2.32 m/s    E/A ratio 1.42     IVRT 82.78 msec    E wave deceleration time 317.11 msec    LV SEPTAL E/E' RATIO 5.87 m/s    LV LATERAL E/E' RATIO 5.87 m/s    LVOT peak martin 1.31 m/s    Left Ventricular Outflow Tract Mean Velocity 0.85 cm/s    Left Ventricular Outflow Tract Mean Gradient 3.41 mmHg    RVDD 2.63 cm    RVOT peak VTI 14.0 cm    TAPSE 2.27 cm    RV/LV Ratio 0.51 cm    LA size 3.94 cm    Left Atrium Minor Axis 5.62 cm    Left Atrium Major Axis 4.60 cm    LA volume (mod) 75.13 cm3    LA Volume Index (Mod) 34.6 mL/m2    RA Major Axis 5.10 cm    AV mean gradient 5 mmHg    AV peak gradient 10 mmHg    Ao peak martin 1.60 m/s    Ao VTI 34.60 cm    LVOT peak VTI 26.30 cm    AV valve area 3.21 cm²    AV Velocity Ratio 0.82     AV index (prosthetic) 0.76     VIDHI by Velocity Ratio 3.46 cm²    Triscuspid Valve Regurgitation Peak Gradient 22 mmHg    PV mean gradient 1 mmHg    PV PEAK VELOCITY 0.86 m/s    PV peak gradient 1 mmHg    RVOT peak martin 0.59 m/s    Ao root annulus 3.72 cm    STJ 3.06 cm    Mean e' 0.15 m/s    ZLVIDS -3.16     ZLVIDD -3.29     LA Volume Index 31.2 mL/m2    LA volume 67.77 cm3    LA WIDTH 4.0 cm    RA Width 3.8 cm    and EKG:   Assessment and Plan:     Mr. Love is a 49 year old male with a past medical history of polysubstance abuse,  and alcohol abuse. He was admitted for accelerated HTN, blood pressure 160/100. Patient notes that in the past 1-2 week(s) sober he noticed his diastolic was in the 100's or triple digits. Patient with SOB and headaches. Patient did have nausea and vomiting. A mild increase in troponin with a peak at 0.08 and trending down.   Recommend ECHO and blood pressure control and will start Norvasc. Troponin is from demand ischemia. If ECHO is normal patient can go home with blood pressure control medications.      Drug overdose, accidental or unintentional, initial encounter   Patient reports that he relapsed last night on both alcohol and heroin  - urine drug screen positive for benzodiazepines and THC  - patient had favorable response after IM Narcan administration  - counseled on cessation of alcohol and illicit drugs  - consider tele psych consult        Hypoxia   Hypoxia on EMS arrival, resolved  - related to drug overdose and may be further complicated by nausea with vomiting and possible aspiration component  - chest x-ray negative  - continuous pulse oximetry monitoring, O2 supplementation if needed, incentive spirometry, p.rcedric Lux, aspiration precautions  - antibiotics not indicated at this time        Lactic acidosis   Lactic acidosis improved with IV fluid resuscitation given in the emergency department  - lactic acid level on arrival 4.2 and repeat 2.3, white blood cell count 10.10, urinalysis negative for infection, chest x-ray negative, T-max 99.1°  - patient received 2 L normal saline IV fluid bolus in the emergency department  - continue IV fluids with normal saline at 100 mL/hr  - check serial lactic acid level  - antibiotics not indicated at this time        Elevated troponin   Elevated troponin most likely related to drug overdose and hypoxia with ventricular strain, abnormal EKG  - troponin 0.037 and repeat 0.080  - EKG #1 with accelerated junctional rhythm and nonspecific ST segment changes with  some elevation  - EKG #2 with normal sinus rhythm at 81 with nonspecific ST segment changes  - chest x-ray negative  - check serial cardiac enzymes, BNP, TSH, fasting lipid panel, and A1c  - check echocardiogram  - NPO except for ice chips and with IV fluids  - telemetry monitoring  - cardiology consult        Elevated blood pressure reading without diagnosis of hypertension  - p.r.n. IV hydralazine with parameters        Polysubstance abuse   Patient admits to heroin use  - urine drug screen was only positive for benzodiazepines and THC  - counseled on illicit drug cessation        Alcohol abuse  - counseled alcohol cessation  - [facility] protocol  - thiamine and folic acid supplementation        Nausea and vomiting   Several episodes of nausea and vomiting while in the emergency department.  Report of possible coffee-ground emesis with initial episode.  -  Hemoglobin 15  - status post Protonix 80 mg IV x1 dose in the emergency department  - continue Protonix 40 mg IV b.I.d. dosing  - NPO except for ice chips, IV fluids, and p.r.n. antiemetics  - check H&H q.6 hours x2, INR, and occult blood  - check CBC in a.m.  - consider GI consult        Elevated CK    Elevated CK level with early rhabdomyolysis secondary to drug overdose  - CK level 866, creatinine 1.2, LFTs overall unremarkable  - patient received 2 L normal saline IV fluid bolus in the emergency department  - continue IV fluids with normal saline at 100 mL/hr  - check a.m. labs to include repeat CK level        Rash    Rash to bilateral lower extremities which patient attributes to hiking in the woods last week with chigger infestation.  Areas are now nonpruritic and healing per patient.        Depression with anxiety   Patient is currently on no home medications for this.  Consider tele psych consult.       VTE Risk Mitigation (From admission, onward)           Ordered     IP VTE LOW RISK PATIENT  Once         05/26/24 0453     Place sequential compression  device  Until discontinued         05/26/24 0453                    Cardiology   [facility] - Telemetry ([facility])

## 2024-05-26 NOTE — ED PROVIDER NOTES
SCRIBE #1 NOTE: IAlcon am scribing for, and in the presence of, Adrien Nj MD. I have scribed the HPI, ROS, and PEx.     SCRIBE #2 NOTE: ISanjuana am scribing for, and in the presence of,  Papo Mccord MD. I have scribed the remaining portions of the note not scribed by Scribe #1.      History     Chief Complaint   Patient presents with    Drug Overdose     Cyanotic and apneic on EMS arrival with SpO2 at 60%. + response to 1mg narcan IM.     Review of patient's allergies indicates:  No Known Allergies      History of Present Illness     HPI    5/25/2024, 11:55 PM  History obtained from the paramedics  HPI and ROS limited due to AMS      History of Present Illness: Timo Love is a 49 y.o. male patient who presents to the Emergency Department for evaluation after a drug overdose that happened PTA. Paramedics report that pt was cyanotic and apneic upon their arrival, with SpO2 at 60%. They administered 1 mg of Narcan IM and had a positive response. No further complaints or concerns at this time.       Arrival mode: EMS    PCP: No primary care provider on file.        Past Medical History:  No past medical history on file.    Past Surgical History:  No past surgical history on file.      Family History:  No family history on file.    Social History:  Social History     Tobacco Use    Smoking status: Not on file    Smokeless tobacco: Not on file   Substance and Sexual Activity    Alcohol use: Not on file    Drug use: Not on file    Sexual activity: Not on file        Review of Systems     Review of Systems   Unable to perform ROS: Mental status change        Physical Exam     Initial Vitals [05/25/24 2322]   BP Pulse Resp Temp SpO2   (!) 160/100 100 16 99.1 °F (37.3 °C) 96 %      MAP       --          Physical Exam  Physical exam limited due to patient's condition.  Nursing Notes and Vital Signs Reviewed.  Constitutional: Patient is in mild distress. Well-developed and well-nourished.  Diaphoretic  Head: Atraumatic. Normocephalic.  Eyes: Pinpoint pupils.   ENT: Mucous membranes are moist.   Neck: Supple. Full ROM.   Cardiovascular: Tachycardic. Regular rhythm. No murmurs, rubs, or gallops. Distal pulses are 2+ and symmetric.  Pulmonary/Chest: No respiratory distress. Clear to auscultation bilaterally. No wheezing or rales.  Abdominal: Soft and non-distended.  There is no tenderness.  No rebound, guarding, or rigidity.  Genitourinary: No CVA tenderness  Musculoskeletal: Moves all extremities. No obvious deformities. No edema. No calf tenderness.  Skin: Warm and dry.  Neurological: Pt is awake but speaking in phrases that do not make sense. Pt does not participate in neurologic exam but moves all extremities spontaneously with good strength. No acute focal neurological deficits are appreciated.     ED Course   Critical Care    Date/Time: 5/26/2024 4:13 AM    Performed by: Papo Mccord MD  Authorized by: Papo Mccord MD  Direct patient critical care time: 20 minutes  Additional history critical care time: 5 minutes  Ordering / reviewing critical care time: 10 minutes  Documentation critical care time: 5 minutes  Consulting other physicians critical care time: 5 minutes  Total critical care time (exclusive of procedural time) : 45 minutes  Critical care time was exclusive of separately billable procedures and treating other patients and teaching time.  Critical care was necessary to treat or prevent imminent or life-threatening deterioration of the following conditions: Overdose, elevated Troponin.  Critical care was time spent personally by me on the following activities: blood draw for specimens, development of treatment plan with patient or surrogate, discussions with consultants, interpretation of cardiac output measurements, evaluation of patient's response to treatment, examination of patient, obtaining history from patient or surrogate, ordering and review of radiographic studies,  ordering and performing treatments and interventions, ordering and review of laboratory studies, pulse oximetry, re-evaluation of patient's condition and review of old charts.        ED Vital Signs:  Vitals:    05/25/24 2322 05/26/24 0030 05/26/24 0038 05/26/24 0100   BP: (!) 160/100 127/82  129/85   Pulse: 100 84 83 79   Resp: 16 (!) 26  19   Temp: 99.1 °F (37.3 °C)      TempSrc: Oral      SpO2: 96% (!) 92%  (!) 93%    05/26/24 0130 05/26/24 0200 05/26/24 0402   BP: (!) 149/92 (!) 140/88 (!) 148/91   Pulse: 82 80 99   Resp: (!) 21 15    Temp:      TempSrc:      SpO2: 95% 95% (!) 94%       Abnormal Lab Results:  Labs Reviewed   CBC W/ AUTO DIFFERENTIAL - Abnormal; Notable for the following components:       Result Value    RBC 4.49 (*)     MCH 33.4 (*)     MPV 8.8 (*)     Gran # (ANC) 8.8 (*)     Immature Grans (Abs) 0.05 (*)     Lymph # 0.7 (*)     Gran % 87.2 (*)     Lymph % 7.0 (*)     All other components within normal limits   COMPREHENSIVE METABOLIC PANEL - Abnormal; Notable for the following components:    CO2 17 (*)     Alkaline Phosphatase 42 (*)     AST 41 (*)     Anion Gap 17 (*)     All other components within normal limits   LACTIC ACID, PLASMA - Abnormal; Notable for the following components:    Lactate (Lactic Acid) 4.2 (*)     All other components within normal limits    Narrative:      LA  critical result(s) called and verbal readback obtained from JUAN M KOLB RN ED by [facility] 05/26/2024 00:42   TROPONIN I - Abnormal; Notable for the following components:    Troponin I 0.037 (*)     All other components within normal limits   DRUG SCREEN PANEL, URINE EMERGENCY - Abnormal; Notable for the following components:    Benzodiazepines Presumptive Positive (*)     THC Presumptive Positive (*)     All other components within normal limits    Narrative:     Specimen Source->Urine   URINALYSIS, REFLEX TO URINE CULTURE - Abnormal; Notable for the following components:    Protein, UA 1+ (*)     Ketones, UA Trace  (*)     All other components within normal limits    Narrative:     Specimen Source->Urine   URINALYSIS MICROSCOPIC - Abnormal; Notable for the following components:    Hyaline Casts, UA 15 (*)     Granular Casts, UA 39 (*)     All other components within normal limits    Narrative:     Specimen Source->Urine   LACTIC ACID, PLASMA - Abnormal; Notable for the following components:    Lactate (Lactic Acid) 2.3 (*)     All other components within normal limits   TROPONIN I - Abnormal; Notable for the following components:    Troponin I 0.080 (*)     All other components within normal limits   CK - Abnormal; Notable for the following components:     (*)     All other components within normal limits   CK   ALCOHOL,MEDICAL (ETHANOL)        All Lab Results:  Results for orders placed or performed during the hospital encounter of 05/25/24   CBC auto differential   Result Value Ref Range    WBC 10.10 3.90 - 12.70 K/uL    RBC 4.49 (L) 4.60 - 6.20 M/uL    Hemoglobin 15.0 14.0 - 18.0 g/dL    Hematocrit 42.3 40.0 - 54.0 %    MCV 94 82 - 98 fL    MCH 33.4 (H) 27.0 - 31.0 pg    MCHC 35.5 32.0 - 36.0 g/dL    RDW 11.9 11.5 - 14.5 %    Platelets 261 150 - 450 K/uL    MPV 8.8 (L) 9.2 - 12.9 fL    Immature Granulocytes 0.5 0.0 - 0.5 %    Gran # (ANC) 8.8 (H) 1.8 - 7.7 K/uL    Immature Grans (Abs) 0.05 (H) 0.00 - 0.04 K/uL    Lymph # 0.7 (L) 1.0 - 4.8 K/uL    Mono # 0.5 0.3 - 1.0 K/uL    Eos # 0.0 0.0 - 0.5 K/uL    Baso # 0.02 0.00 - 0.20 K/uL    nRBC 0 0 /100 WBC    Gran % 87.2 (H) 38.0 - 73.0 %    Lymph % 7.0 (L) 18.0 - 48.0 %    Mono % 5.1 4.0 - 15.0 %    Eosinophil % 0.0 0.0 - 8.0 %    Basophil % 0.2 0.0 - 1.9 %    Differential Method Automated    Comprehensive metabolic panel   Result Value Ref Range    Sodium 136 136 - 145 mmol/L    Potassium 4.2 3.5 - 5.1 mmol/L    Chloride 102 95 - 110 mmol/L    CO2 17 (L) 23 - 29 mmol/L    Glucose 105 70 - 110 mg/dL    BUN 6 6 - 20 mg/dL    Creatinine 1.2 0.5 - 1.4 mg/dL    Calcium 8.9  8.7 - 10.5 mg/dL    Total Protein 7.4 6.0 - 8.4 g/dL    Albumin 4.4 3.5 - 5.2 g/dL    Total Bilirubin 0.4 0.1 - 1.0 mg/dL    Alkaline Phosphatase 42 (L) 55 - 135 U/L    AST 41 (H) 10 - 40 U/L    ALT 30 10 - 44 U/L    eGFR >60 >60 mL/min/1.73 m^2    Anion Gap 17 (H) 8 - 16 mmol/L   Lactic acid, plasma   Result Value Ref Range    Lactate (Lactic Acid) 4.2 (HH) 0.5 - 2.2 mmol/L   Troponin I   Result Value Ref Range    Troponin I 0.037 (H) 0.000 - 0.026 ng/mL   Drug screen panel, emergency   Result Value Ref Range    Benzodiazepines Presumptive Positive (A) Negative    Methadone metabolites Negative Negative    Cocaine (Metab.) Negative Negative    Opiate Scrn, Ur Negative Negative    Barbiturate Screen, Ur Negative Negative    Amphetamine Screen, Ur Negative Negative    THC Presumptive Positive (A) Negative    Phencyclidine Negative Negative    Creatinine, Urine 117.7 23.0 - 375.0 mg/dL    Toxicology Information SEE COMMENT    Urinalysis, Reflex to Urine Culture Urine, Clean Catch    Specimen: Urine, Clean Catch   Result Value Ref Range    Specimen UA Urine, Clean Catch     Color, UA Yellow Yellow, Straw, Candelaria    Appearance, UA Clear Clear    pH, UA 5.0 5.0 - 8.0    Specific Gravity, UA 1.015 1.005 - 1.030    Protein, UA 1+ (A) Negative    Glucose, UA Negative Negative    Ketones, UA Trace (A) Negative    Bilirubin (UA) Negative Negative    Occult Blood UA Negative Negative    Nitrite, UA Negative Negative    Urobilinogen, UA Negative <2.0 EU/dL    Leukocytes, UA Negative Negative   Urinalysis Microscopic   Result Value Ref Range    RBC, UA 0 0 - 4 /hpf    WBC, UA 3 0 - 5 /hpf    Bacteria None None-Occ /hpf    Hyaline Casts, UA 15 (A) 0-1/lpf /lpf    Granular Casts, UA 39 (A) None /lpf    Microscopic Comment SEE COMMENT    Lactic acid, plasma   Result Value Ref Range    Lactate (Lactic Acid) 2.3 (H) 0.5 - 2.2 mmol/L   Troponin I   Result Value Ref Range    Troponin I 0.080 (H) 0.000 - 0.026 ng/mL   CK   Result Value  Ref Range     (H) 20 - 200 U/L         Imaging Results:  Imaging Results              X-Ray Chest AP Portable (Final result)  Result time 05/26/24 01:37:38      Final result by Remy Yun MD (05/26/24 01:37:38)                   Impression:      No acute findings      Electronically signed by: Remy Yun  Date:    05/26/2024  Time:    01:37               Narrative:    EXAMINATION:  XR CHEST AP PORTABLE    CLINICAL HISTORY:  overdose, hypoxia;    TECHNIQUE:  Single frontal view of the chest was performed.    COMPARISON:  None    FINDINGS:  Lungs are clear. No focal consolidation. No pleural effusion. No pneumothorax. Normal heart size.                                    The EKG was ordered, reviewed, and independently interpreted by the ED provider.  Interpretation time: 00:11  Rate: 87 BPM  Rhythm: Accelerated Junctional rhythm  Interpretation: ST elevation consider lateral injury or acute infarct. ** ** ACUTE MI / STEMI ** **. No STEMI.    The EKG was ordered, reviewed, and independently interpreted by the ED provider.  Interpretation time: 00:36  Rate: 81 BPM  Rhythm: normal sinus rhythm  Interpretation: Rightward axis. Prolonged QT. No STEMI.         The Emergency Provider reviewed the vital signs and test results, which are outlined above.     ED Discussion     12:36 AM: Pt restless during first EKG so a second EKG was obtained.    2:00 AM: Dr. Nj transfers care of patient to Dr. Mccord pending X-ray results.    2:22 AM: TOMASZ Mccord agrees with Dr. Nj's assessment and plan of care. Evaluated pt. Pt is resting comfortably and is in no acute distress.  D/w pt all pertinent results. D/w pt any concerns expressed at this time. Answered all questions. Pt expresses understanding at this time.    4:00 AM:  Patient just vomited again.  Does not appear to be hematemesis.  More Zofran ordered    4:12 AM: Discussed case with Janet Mohr MD (Hospital Medicine). Dr. Mohr agrees with current  care and management of pt and accepts admission.   Admitting Service: Hospital Medicine  Admitting Physician: Dr. Mohr  Admit to: Inpatient Tele    4:12 AM: Re-evaluated pt. I have discussed test results, shared treatment plan, and the need for admission with patient and family at bedside. Pt and family express understanding at this time and agree with all information. All questions answered. Pt and family have no further questions or concerns at this time. Pt is ready for admit.      ED Course as of 05/26/24 0415   Sun May 26, 2024   0045 Lactic Acid Level(!!): 4.2 []   0045 WBC: 10.10 []      ED Course User Index  [] Adrien Nj MD     Medical Decision Making  49 y.o. M who said he relapsed tonight on alcohol and heroin. He was found cyanotic on EMS arrival with o2 sats in the 60's. Given narcan with positive response with paramedics.  He was initially seen by another ED provider.  Care was handed off to me with workup and disposition pending.  He has not required additional Narcan here, however his lactic was greater than 4, CPK elevated, and troponin elevated. 2 L of IV fluids given. Repeat lactic still elevated, but improving.  Second troponin however is higher than the 1st (0.037 -> 0.080) He has vomited several times.  Initial treating physician ordered some protonix because first episode looked like coffee ground emesis. Hemoglobin is normal.  The emesis episode that I witnessed did not appear to be hematemesis.  No chest pain.  Aspirin held with the possible hematemesis episode.  Patient admitted to Medicine    Amount and/or Complexity of Data Reviewed  Independent Historian: EMS     Details: See HPI for details.   External Data Reviewed: notes.     Details: Past medical history, medications, allergies sections reviewed  Labs: ordered. Decision-making details documented in ED Course.  Radiology: ordered. Decision-making details documented in ED Course.  ECG/medicine tests: ordered and  independent interpretation performed. Decision-making details documented in ED Course.    Risk  Prescription drug management.  Decision regarding hospitalization.                ED Medication(s):  Medications   ondansetron injection 4 mg (has no administration in time range)   0.9%  NaCl infusion (0 mLs Intravenous Stopped 5/26/24 0137)   ondansetron injection 4 mg (4 mg Intravenous Given 5/26/24 0132)   pantoprazole injection 80 mg (80 mg Intravenous Given 5/26/24 0132)   sodium chloride 0.9% bolus 1,000 mL 1,000 mL (0 mLs Intravenous Stopped 5/26/24 0236)       New Prescriptions    No medications on file               Scribe Attestation:   Scribe #1: I performed the above scribed service and the documentation accurately describes the services I performed. I attest to the accuracy of the note.     Attending:   Physician Attestation Statement for Scribe #1: I, Adrien Nj MD, personally performed the services described in this documentation, as scribed by Alcon Aviles, in my presence, and it is both accurate and complete.       Scribe Attestation:   Scribe #2: I performed the above scribed service and the documentation accurately describes the services I performed. I attest to the accuracy of the note.    Attending Attestation:           Physician Attestation for Scribe:    Physician Attestation Statement for Scribe #2: I, Papo Mccord MD, reviewed documentation, as scribed by Sanjuana Aviles in my presence, and it is both accurate and complete. I also acknowledge and confirm the content of the note done by Scribe #1.           Clinical Impression       ICD-10-CM ICD-9-CM   1. Elevated troponin  R79.89 790.6   2. Overdose  T50.901A 977.9     E980.5   3. Lactic acidosis  E87.20 276.2   4. Non-traumatic rhabdomyolysis  M62.82 728.88       Disposition:   Disposition: Admitted  Condition: Papo Ortega MD  05/26/24 3804

## 2024-05-26 NOTE — ASSESSMENT & PLAN NOTE
- counseled alcohol cessation  - [facility] protocol  - thiamine and folic acid supplementation

## 2024-05-26 NOTE — DISCHARGE SUMMARY
[facility] Medicine  Discharge Summary      Patient Name: Timo Love  MRN: [id number]  [facility]: [id number]  Patient Class: IP- Inpatient  Admission Date: 5/25/2024  Hospital Length of Stay: 0 days  Discharge Date and Time:  05/26/2024 6:26 PM AGAINST MEDICAL ADVICE   Attending Physician: Amanda Lipscomb MD   Discharging Provider: Amanda Lipscomb MD  Primary Care Provider: No primary care provider on file.    Primary Care Team: Networked reference to record PCT     HPI:     49-year-old white man with history of polysubstance abuse, alcohol abuse, depression with anxiety, and previous alcohol withdrawal seizures who presented to the emergency department via EMS after being found cyanotic, apneic, and with O2 saturation in the 60s.  Patient was administered 1 mg of Narcan IM with good response.  Patient reports that he had been clean and sober from heroin for over a year and had been clean and sober from alcohol for 42 days.  Patient relapsed last night on both alcohol and heroin.  Patient had been in his usual state of health prior to presentation.  He does report that he noticed that his blood pressure has been running higher than usual since he has not been drinking alcohol.  He currently denies any chest pain, cough, fevers, chills.  He does have some mild shortness of breath, constant, no aggravating or alleviating factors, no associated wheezing. He did have several episodes of nausea with vomiting in the emergency department with his 1st episode appearing like coffee-ground emesis.  ER physician did give Protonix 80 mg IV x1 dose.  Patient denies recent bleeding from any source.  He denies hemoptysis, hematemesis, bright red blood per rectum, melena, or hematuria.  He denies any associated abdominal pain.  Patient does have a rash (related to chiggers) to his bilateral lower extremities which he reports was from hiking last week, areas are no longer pruritic and are healing  per patient.    * No surgery found *      Hospital Course:   Cardiology and tele psychiatry consulted. Pt continued on IVF given mildly elevated CPK.     Notified by RN approx 6pm that patient had gathered his belongings, taken out his IV and wanted to leave hospital. Advised patient that he would benefit from remaining in hospital for continued fluids, monitoring of labs and risks of leaving include worsening illness and death. He proceeded to walk out of the room as I was speaking with him. AMA paperwork signed at nurses station with multiple witnesses. Pt left hospital AGAINST MEDICAL ADVICE.       Goals of Care Treatment Preferences:  Code Status: Full Code      Consults:   Consults (From admission, onward)          Status Ordering Provider     Inpatient consult to Telemedicine - Psych  Once        Provider:  (Not yet assigned)    Completed SIMI MILLER     Inpatient consult to Cardiology  Once        Provider:  Doron Baumann MD    Completed HAMLET GUADARRAMA            No new Assessment & Plan notes have been filed under this hospital service since the last note was generated.  Service: Hospital Medicine    Final Active Diagnoses:    Diagnosis Date Noted POA    PRINCIPAL PROBLEM:  Drug overdose, accidental or unintentional, initial encounter [T50.901A] 05/26/2024 Yes    Hypoxia [R09.02] 05/26/2024 Yes    Lactic acidosis [E87.20] 05/26/2024 Yes    Elevated troponin [R79.89] 05/26/2024 Yes    Elevated blood pressure reading without diagnosis of hypertension [R03.0] 05/26/2024 Yes    Polysubstance abuse [F19.10] 05/26/2024 Yes    Alcohol abuse [F10.10] 05/26/2024 Yes    Nausea and vomiting [R11.2] 05/26/2024 Yes    Depression with anxiety [F41.8] 05/26/2024 Yes    Rash [R21] 05/26/2024 Yes    Elevated CK [R74.8] 05/26/2024 Yes      Problems Resolved During this Admission:       Discharged Condition: against medical advice    Disposition:     Follow Up:    Patient Instructions:   No discharge procedures  on file.    Significant Diagnostic Studies: See Hospital Course     Pending Diagnostic Studies:       None           Medications:  Reconciled Home Medications:      Medication List      You have not been prescribed any medications.         Indwelling Lines/Drains at time of discharge:   Lines/Drains/Airways       None                   Time spent on the discharge of patient: 25 minutes         Amanda Lipscomb MD  Department of Hospital Medicine  [facility] ([facility])

## 2024-05-26 NOTE — ED NOTES
Pt tearful and raising his voice upon arriving to ED bed 11.  Vitals are stable.  Breathing is regular and non-labored.  +response to narcan administered by EMS on scene.

## 2024-05-26 NOTE — ASSESSMENT & PLAN NOTE
Elevated troponin most likely related to drug overdose and hypoxia with ventricular strain, abnormal EKG  - troponin 0.037 and repeat 0.080  - EKG #1 with accelerated junctional rhythm and nonspecific ST segment changes with some elevation  - EKG #2 with normal sinus rhythm at 81 with nonspecific ST segment changes  - chest x-ray negative  - check serial cardiac enzymes, BNP, TSH, fasting lipid panel, and A1c  - check echocardiogram  - NPO except for ice chips and with IV fluids  - telemetry monitoring  - cardiology consult

## 2024-05-26 NOTE — ASSESSMENT & PLAN NOTE
Several episodes of nausea and vomiting while in the emergency department.  Report of possible coffee-ground emesis with initial episode.  -  Hemoglobin 15  - status post Protonix 80 mg IV x1 dose in the emergency department  - continue Protonix 40 mg IV b.I.d. dosing  - NPO except for ice chips, IV fluids, and p.r.n. antiemetics  - check H&H q.6 hours x2, INR, and occult blood  - check CBC in a.m.  - consider GI consult

## 2024-05-26 NOTE — ASSESSMENT & PLAN NOTE
Patient reports that he relapsed last night on both alcohol and heroin  - urine drug screen positive for benzodiazepines and THC  - patient had favorable response after IM Narcan administration  - counseled on cessation of alcohol and illicit drugs  - consider tele psych consult
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
## 2024-05-26 NOTE — ASSESSMENT & PLAN NOTE
Hypoxia on EMS arrival, resolved  - related to drug overdose and may be further complicated by nausea with vomiting and possible aspiration component  - chest x-ray negative  - continuous pulse oximetry monitoring, O2 supplementation if needed, incentive spirometry, althea Lux, aspiration precautions  - antibiotics not indicated at this time

## 2024-05-26 NOTE — NURSING
"Pt called unit desk, saying he was ready to leave and he was about "to start pulling stuff out."  Dr Lipscomb went to see pt, this RN witnessed her explanation of the risks/benefits of staying vs leaving against medical advice.  Pt said he was trying to do the right thing, but he just needed to go.  He signed AMA paperwork and saw himself to the elevator.  "

## 2024-05-26 NOTE — PLAN OF CARE
"Brief Plan of Care Update     PT admitted to hospital medicine earlier this AM after heroin overdose.  Pt seen and examined, awake, alert, oriented x 3. Reports episode of bilious emesis overnight. Has no complaints this AM, denies CP/SOB/abd pain. Reports he was using alcohol and heroin yesterday to "numb his pain". Denies SI/HI    On exam, AOX4, appropriate mood and affect, breathing comfortably on room air. Has scabs to BLE from  prev "chigger bites"     Plan   Continue IVF  Trop plateaud- likely elevated in setting of overdose, followup TTE  Will consult tele psych to eval.     Full note to follow in AM     Amanda Lipscomb MD   Hospital Medicine   "

## 2024-05-26 NOTE — SUBJECTIVE & OBJECTIVE
No past medical history on file.    No past surgical history on file.    Review of patient's allergies indicates:  No Known Allergies    No current facility-administered medications on file prior to encounter.     No current outpatient medications on file prior to encounter.     Family History    None       Tobacco Use    Smoking status: Not on file    Smokeless tobacco: Not on file   Substance and Sexual Activity    Alcohol use: Not on file    Drug use: Not on file    Sexual activity: Not on file     Review of Systems   Constitutional:  Negative for chills and fever.   Respiratory:  Positive for shortness of breath. Negative for cough.    Cardiovascular:  Negative for chest pain.   Gastrointestinal:  Positive for nausea and vomiting. Negative for abdominal pain, blood in stool and diarrhea.   Skin:  Positive for rash.   All other systems reviewed and are negative.    Objective:     Vital Signs (Most Recent):  Temp: 99.1 °F (37.3 °C) (05/25/24 2322)  Pulse: 99 (05/26/24 0402)  Resp: 15 (05/26/24 0200)  BP: (!) 148/91 (05/26/24 0402)  SpO2: (!) 94 % (05/26/24 0402) Vital Signs (24h Range):  Temp:  [99.1 °F (37.3 °C)] 99.1 °F (37.3 °C)  Pulse:  [] 99  Resp:  [15-26] 15  SpO2:  [92 %-96 %] 94 %  BP: (127-160)/() 148/91        There is no height or weight on file to calculate BMI.     Physical Exam  Vitals reviewed.   Constitutional:       General: He is not in acute distress.     Appearance: He is not ill-appearing or diaphoretic.   HENT:      Nose: Nose normal.   Eyes:      Conjunctiva/sclera: Conjunctivae normal.   Cardiovascular:      Rate and Rhythm: Normal rate.   Pulmonary:      Effort: Pulmonary effort is normal. No respiratory distress.      Breath sounds: No wheezing.   Abdominal:      General: There is no distension.      Tenderness: There is no abdominal tenderness.   Musculoskeletal:         General: No swelling.   Skin:     General: Skin is warm and dry.      Findings: Rash (to BLE related to  recent chiggers per patient) present.   Neurological:      Mental Status: He is alert and oriented to person, place, and time.   Psychiatric:         Mood and Affect: Mood normal.         Behavior: Behavior normal.                Significant Labs: All pertinent labs within the past 24 hours have been reviewed.  Recent Lab Results  (Last 5 results in the past 24 hours)        05/26/24  0425   05/26/24  0313   05/26/24  0040   05/26/24  0029   05/26/24  0013        Benzodiazepines       Presumptive Positive         Methadone metabolites       Negative         Phencyclidine       Negative         Albumin         4.4       Alcohol, Serum 21               ALP         42       ALT         30       Amphetamines, Urine       Negative         Anion Gap         17       Appearance, UA     Clear           AST         41       Bacteria, UA     None           Barbituates, Urine       Negative         Baso #         0.02       Basophil %         0.2       Bilirubin (UA)     Negative           BILIRUBIN TOTAL         0.4  Comment: For infants and newborns, interpretation of results should be based  on gestational age, weight and in agreement with clinical  observations.    Premature Infant recommended reference ranges:  Up to 24 hours.............<8.0 mg/dL  Up to 48 hours............<12.0 mg/dL  3-5 days..................<15.0 mg/dL  6-29 days.................<15.0 mg/dL         BUN         6       Calcium         8.9       Chloride         102       CO2         17       Cocaine, Urine       Negative         Color, UA     Yellow           CPK   866             Creatinine         1.2       Urine Creatinine       117.7         Differential Method         Automated       eGFR         >60       Eos #         0.0       Eos %         0.0       Glucose         105       Glucose, UA     Negative           Gran # (ANC)         8.8       Gran %         87.2       Granular Casts, UA     39           Hematocrit         42.3       Hemoglobin          15.0       Hyaline Casts, UA     15           Immature Grans (Abs)         0.05  Comment: Mild elevation in immature granulocytes is non specific and   can be seen in a variety of conditions including stress response,   acute inflammation, trauma and pregnancy. Correlation with other   laboratory and clinical findings is essential.         Immature Granulocytes         0.5       Ketones, UA     Trace           Lactic Acid Level   2.3  Comment: Falsely low lactic acid results can be found in samples   containing >=13.0 mg/dL total bilirubin and/or >=3.5 mg/dL   direct bilirubin.         4.2  Comment: Falsely low lactic acid results can be found in samples   containing >=13.0 mg/dL total bilirubin and/or >=3.5 mg/dL   direct bilirubin.  LA  critical result(s) called and verbal readback obtained from JUAN M KOLB RN ED by [facility] 05/26/2024 00:42         Leukocyte Esterase, UA     Negative           Lymph #         0.7       Lymph %         7.0       MCH         33.4       MCHC         35.5       MCV         94       Microscopic Comment     SEE COMMENT  Comment: Other formed elements not mentioned in the report are not   present in the microscopic examination.              Mono #         0.5       Mono %         5.1       MPV         8.8       NITRITE UA     Negative           nRBC         0       Blood, UA     Negative           Opiates, Urine       Negative         pH, UA     5.0           Platelet Count         261       Potassium         4.2       PROTEIN TOTAL         7.4       Protein, UA     1+  Comment: Recommend a 24 hour urine protein or a urine   protein/creatinine ratio if globulin induced proteinuria is  clinically suspected.             RBC         4.49       RBC, UA     0           RDW         11.9       Sodium         136       Specific [place], UA     1.015           Specimen UA     Urine, Clean Catch           Marijuana (THC) Metabolite       Presumptive Positive         Toxicology Information        SEE COMMENT  Comment: This screen includes the following classes of drugs at the listed   cut-off:    Benzodiazepines 200 ng/ml  Methadone 300 ng/ml  Cocaine metabolite 300 ng/ml  Opiates 300 ng/ml  Barbiturates 200 ng/ml  Amphetamines 1000 ng/ml  Marijuana metabs (THC) 50 ng/ml  Phencyclidine (PCP) 25 ng/ml    This is a screening test. If results do not correlate with clinical   presentation, then a confirmatory send out test is advised.     This report is intended for use in clinical monitoring and management   of   patients. It is not intended for use in employment related drug   testing.           Troponin I   0.080  Comment: The reference interval for Troponin I represents the 99th percentile   cutoff   for our facility and is consistent with 3rd generation assay   performance.         0.037  Comment: The reference interval for Troponin I represents the 99th percentile   cutoff   for our facility and is consistent with 3rd generation assay   performance.         UROBILINOGEN UA     Negative           WBC, UA     3           WBC         10.10                              Significant Imaging: I have reviewed all pertinent imaging results/findings within the past 24 hours.  X-Ray Chest AP Portable   Final Result      No acute findings         Electronically signed by: Remy Yun   Date:    05/26/2024   Time:    01:37

## 2024-05-26 NOTE — PLAN OF CARE
[facility] - Telemetry (Hospital)  Initial Discharge Assessment       Primary Care Provider: No primary care provider on file.    Admission Diagnosis: Lactic acidosis [E87.20]  Overdose [T50.901A]  Elevated troponin [R79.89]  Elevated troponin I level [R79.89]  Chest pain [R07.9]  Non-traumatic rhabdomyolysis [M62.82]    Admission Date: 5/25/2024  Expected Discharge Date:     Transition of Care Barriers: None    Payor: /     Extended Emergency Contact Information  Primary Emergency Contact: Enid Hou  Mobile Phone: [phone]  Relation: Spouse    Discharge Plan A: Home  Discharge Plan B: Home with family    No Pharmacies Listed    Initial Assessment (most recent)       Adult Discharge Assessment - 05/26/24 1102          Discharge Assessment    Assessment Type Discharge Planning Assessment     Confirmed/corrected address, phone number and insurance Yes     Confirmed Demographics Correct on Facesheet     Source of Information patient     When was your last doctors appointment? --   pt currently transitioned to [place] and is looking for providers    Does patient/caregiver understand observation status Yes     Reason For Admission Overdose     People in Home alone     Do you expect to return to your current living situation? Yes     Do you have help at home or someone to help you manage your care at home? No     Prior to hospitilization cognitive status: Alert/Oriented     Current cognitive status: Alert/Oriented     Walking or Climbing Stairs Difficulty no     Dressing/Bathing Difficulty no     Equipment Currently Used at Home none     Readmission within 30 days? No     Patient currently being followed by outpatient case management? No     Do you currently have service(s) that help you manage your care at home? No     Who is going to help you get home at discharge? Enid Hou, [phone]     Are you on dialysis? No     Do you take coumadin? No     Discharge Plan A Home     Discharge Plan B Home with family      Discharge Plan discussed with: Patient     Transition of Care Barriers None                      Swer met with pt at the bedside to complete d/c assessment. Pt requested to have some one reach out to spouse to advise of hospital stay. Swer attempted and left vm to advise. JAZLYN,MSW

## 2024-05-26 NOTE — ASSESSMENT & PLAN NOTE
Elevated CK level with early rhabdomyolysis secondary to drug overdose  - CK level 866, creatinine 1.2, LFTs overall unremarkable  - patient received 2 L normal saline IV fluid bolus in the emergency department  - continue IV fluids with normal saline at 100 mL/hr  - check a.m. labs to include repeat CK level

## 2024-05-26 NOTE — ASSESSMENT & PLAN NOTE
Rash to bilateral lower extremities which patient attributes to hiking in the woods last week with chigger infestation.  Areas are now nonpruritic and healing per patient.

## 2024-05-27 LAB
OHS QRS DURATION: 100 MS
OHS QTC CALCULATION: 490 MS